# Patient Record
Sex: MALE | Race: WHITE | NOT HISPANIC OR LATINO | Employment: STUDENT | URBAN - METROPOLITAN AREA
[De-identification: names, ages, dates, MRNs, and addresses within clinical notes are randomized per-mention and may not be internally consistent; named-entity substitution may affect disease eponyms.]

---
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
## 2023-02-06 ENCOUNTER — OFFICE VISIT (OUTPATIENT)
Dept: INTERNAL MEDICINE | Facility: CLINIC | Age: 18
End: 2023-02-06
Payer: COMMERCIAL

## 2023-02-06 DIAGNOSIS — S16.1XXA STRAIN OF NECK MUSCLE, INITIAL ENCOUNTER: Primary | ICD-10-CM

## 2023-02-06 PROCEDURE — 1160F RVW MEDS BY RX/DR IN RCRD: CPT | Mod: CPTII,95,, | Performed by: PHYSICIAN ASSISTANT

## 2023-02-06 PROCEDURE — 1159F MED LIST DOCD IN RCRD: CPT | Mod: CPTII,95,, | Performed by: PHYSICIAN ASSISTANT

## 2023-02-06 PROCEDURE — 1159F PR MEDICATION LIST DOCUMENTED IN MEDICAL RECORD: ICD-10-PCS | Mod: CPTII,95,, | Performed by: PHYSICIAN ASSISTANT

## 2023-02-06 PROCEDURE — 99214 OFFICE O/P EST MOD 30 MIN: CPT | Mod: 95,,, | Performed by: PHYSICIAN ASSISTANT

## 2023-02-06 PROCEDURE — 1160F PR REVIEW ALL MEDS BY PRESCRIBER/CLIN PHARMACIST DOCUMENTED: ICD-10-PCS | Mod: CPTII,95,, | Performed by: PHYSICIAN ASSISTANT

## 2023-02-06 PROCEDURE — 99214 PR OFFICE/OUTPT VISIT, EST, LEVL IV, 30-39 MIN: ICD-10-PCS | Mod: 95,,, | Performed by: PHYSICIAN ASSISTANT

## 2023-02-06 RX ORDER — IBUPROFEN 800 MG/1
800 TABLET ORAL EVERY 8 HOURS PRN
Qty: 20 TABLET | Refills: 0 | Status: SHIPPED | OUTPATIENT
Start: 2023-02-06 | End: 2023-02-16

## 2023-02-06 RX ORDER — TIZANIDINE 4 MG/1
4 TABLET ORAL NIGHTLY PRN
Qty: 10 TABLET | Refills: 0 | Status: SHIPPED | OUTPATIENT
Start: 2023-02-06 | End: 2023-02-16

## 2023-02-06 NOTE — PROGRESS NOTES
Subjective:      Patient ID: Agusto Meza is a 17 y.o. male.    Chief Complaint: No chief complaint on file.    The patient location is: [place]   The chief complaint leading to consultation is: neck pain    Visit type: audiovisual    Face to Face time with patient: 9:58-10:02  10 minutes of total time spent on the encounter, which includes face to face time and non-face to face time preparing to see the patient (eg, review of tests), Obtaining and/or reviewing separately obtained history, Documenting clinical information in the electronic or other health record, Independently interpreting results (not separately reported) and communicating results to the patient/family/caregiver, or Care coordination (not separately reported).     Each patient to whom he or she provides medical services by telemedicine is:  (1) informed of the relationship between the physician and patient and the respective role of any other health care provider with respect to management of the patient; and (2) notified that he or she may decline to receive medical services by telemedicine and may withdraw from such care at any time.    Notes: Patient is new to me, being seen today for neck pain.  Reports woke up Saturday with neck pain, seemed better yesterday but this morning worse again.  Feels he may have slept wrong.  Pain radiates from neck musculature and radiates towards shoulder blades.  Denies arm pain, numbness/tingling.     Has tried ibuprofen, heating pad without relief    Review of Systems   Constitutional:  Negative for activity change, chills, diaphoresis, fever and unexpected weight change.   HENT:  Negative for hearing loss, rhinorrhea and trouble swallowing.    Eyes:  Negative for discharge and visual disturbance.   Respiratory:  Negative for chest tightness and wheezing.    Cardiovascular:  Negative for chest pain and palpitations.   Gastrointestinal:  Negative for blood in stool, constipation, diarrhea and vomiting.    Endocrine: Negative for polydipsia and polyuria.   Genitourinary:  Negative for difficulty urinating, hematuria and urgency.   Musculoskeletal:  Positive for neck pain. Negative for arthralgias, joint swelling and myalgias.   Neurological:  Negative for dizziness, weakness, light-headedness and headaches.   Psychiatric/Behavioral:  Negative for confusion and dysphoric mood.      Objective:   There were no vitals taken for this visit.  Physical Exam  Constitutional:       General: He is not in acute distress.     Appearance: He is well-developed. He is not ill-appearing or diaphoretic.   HENT:      Head: Normocephalic and atraumatic.      Right Ear: External ear normal.      Left Ear: External ear normal.   Eyes:      General: Lids are normal.         Right eye: No discharge.         Left eye: No discharge.      Conjunctiva/sclera: Conjunctivae normal.      Right eye: Right conjunctiva is not injected.      Left eye: Left conjunctiva is not injected.   Neck:     Pulmonary:      Effort: Pulmonary effort is normal. No respiratory distress.   Musculoskeletal:      Cervical back: Normal range of motion. Muscular tenderness present.   Skin:     General: Skin is warm and dry.      Findings: No rash.   Neurological:      Mental Status: He is alert and oriented to person, place, and time.   Psychiatric:         Speech: Speech normal.         Behavior: Behavior normal.         Thought Content: Thought content normal.         Judgment: Judgment normal.     Assessment:      1. Strain of neck muscle, initial encounter       Plan:   Strain of neck muscle, initial encounter  -     ibuprofen (ADVIL,MOTRIN) 800 MG tablet; Take 1 tablet (800 mg total) by mouth every 8 (eight) hours as needed for Pain.  Dispense: 20 tablet; Refill: 0  -     tiZANidine (ZANAFLEX) 4 MG tablet; Take 1 tablet (4 mg total) by mouth nightly as needed (Muscle strain).  Dispense: 10 tablet; Refill: 0      Discussed RICE, NSAIDs, warm heat  Caution with  muscle relaxer as may cause drowsienss     Discussed worsening signs/symptoms and when to return to clinic or go to ED.   Patient expresses understanding and agrees with treatment plan.

## 2023-11-10 ENCOUNTER — OFFICE VISIT (OUTPATIENT)
Dept: FAMILY MEDICINE | Facility: CLINIC | Age: 18
End: 2023-11-10
Payer: COMMERCIAL

## 2023-11-10 VITALS
HEIGHT: 71 IN | SYSTOLIC BLOOD PRESSURE: 110 MMHG | WEIGHT: 198.94 LBS | BODY MASS INDEX: 27.85 KG/M2 | DIASTOLIC BLOOD PRESSURE: 70 MMHG | OXYGEN SATURATION: 97 % | TEMPERATURE: 98 F | HEART RATE: 79 BPM

## 2023-11-10 DIAGNOSIS — J32.9 SINUSITIS, UNSPECIFIED CHRONICITY, UNSPECIFIED LOCATION: Primary | ICD-10-CM

## 2023-11-10 DIAGNOSIS — R05.9 COUGH, UNSPECIFIED TYPE: ICD-10-CM

## 2023-11-10 PROCEDURE — 3078F DIAST BP <80 MM HG: CPT | Mod: CPTII,S$GLB,, | Performed by: REGISTERED NURSE

## 2023-11-10 PROCEDURE — 3074F SYST BP LT 130 MM HG: CPT | Mod: CPTII,S$GLB,, | Performed by: REGISTERED NURSE

## 2023-11-10 PROCEDURE — 99999 PR PBB SHADOW E&M-EST. PATIENT-LVL III: CPT | Mod: PBBFAC,,, | Performed by: REGISTERED NURSE

## 2023-11-10 PROCEDURE — 3008F PR BODY MASS INDEX (BMI) DOCUMENTED: ICD-10-PCS | Mod: CPTII,S$GLB,, | Performed by: REGISTERED NURSE

## 2023-11-10 PROCEDURE — 3008F BODY MASS INDEX DOCD: CPT | Mod: CPTII,S$GLB,, | Performed by: REGISTERED NURSE

## 2023-11-10 PROCEDURE — 99213 OFFICE O/P EST LOW 20 MIN: CPT | Mod: 25,S$GLB,, | Performed by: REGISTERED NURSE

## 2023-11-10 PROCEDURE — 96372 THER/PROPH/DIAG INJ SC/IM: CPT | Mod: S$GLB,,, | Performed by: REGISTERED NURSE

## 2023-11-10 PROCEDURE — 1159F MED LIST DOCD IN RCRD: CPT | Mod: CPTII,S$GLB,, | Performed by: REGISTERED NURSE

## 2023-11-10 PROCEDURE — 3078F PR MOST RECENT DIASTOLIC BLOOD PRESSURE < 80 MM HG: ICD-10-PCS | Mod: CPTII,S$GLB,, | Performed by: REGISTERED NURSE

## 2023-11-10 PROCEDURE — 3074F PR MOST RECENT SYSTOLIC BLOOD PRESSURE < 130 MM HG: ICD-10-PCS | Mod: CPTII,S$GLB,, | Performed by: REGISTERED NURSE

## 2023-11-10 PROCEDURE — 96372 PR INJECTION,THERAP/PROPH/DIAG2ST, IM OR SUBCUT: ICD-10-PCS | Mod: S$GLB,,, | Performed by: REGISTERED NURSE

## 2023-11-10 PROCEDURE — 99999 PR PBB SHADOW E&M-EST. PATIENT-LVL III: ICD-10-PCS | Mod: PBBFAC,,, | Performed by: REGISTERED NURSE

## 2023-11-10 PROCEDURE — 1159F PR MEDICATION LIST DOCUMENTED IN MEDICAL RECORD: ICD-10-PCS | Mod: CPTII,S$GLB,, | Performed by: REGISTERED NURSE

## 2023-11-10 PROCEDURE — 99213 PR OFFICE/OUTPT VISIT, EST, LEVL III, 20-29 MIN: ICD-10-PCS | Mod: 25,S$GLB,, | Performed by: REGISTERED NURSE

## 2023-11-10 RX ORDER — METHYLPREDNISOLONE ACETATE 80 MG/ML
80 INJECTION, SUSPENSION INTRA-ARTICULAR; INTRALESIONAL; INTRAMUSCULAR; SOFT TISSUE ONCE
Status: COMPLETED | OUTPATIENT
Start: 2023-11-10 | End: 2023-11-10

## 2023-11-10 RX ORDER — AMOXICILLIN AND CLAVULANATE POTASSIUM 875; 125 MG/1; MG/1
1 TABLET, FILM COATED ORAL 2 TIMES DAILY
Qty: 14 TABLET | Refills: 0 | Status: SHIPPED | OUTPATIENT
Start: 2023-11-10 | End: 2023-11-17

## 2023-11-10 RX ADMIN — METHYLPREDNISOLONE ACETATE 80 MG: 80 INJECTION, SUSPENSION INTRA-ARTICULAR; INTRALESIONAL; INTRAMUSCULAR; SOFT TISSUE at 10:11

## 2023-11-10 NOTE — PROGRESS NOTES
"SUBJECTIVE:     Agusto Meza is a 18 y.o. male is here today for:  Sinus Problem and Headache    HPI:    Max is here with c/o illness x 10 days.  Woke up today feeling worse, severe HA pain.  Tx with Mucinex but has not helped.  Negative for any ill contacts although is a college student; roommate has been well.  Reports RN, NC, sinus pressure, sore throat w/ prod cough.      REVIEW OF SYSTEMS:  Review of Systems   Constitutional:  Negative for chills and fever.   HENT:  Positive for congestion, sinus pain and sore throat. Negative for ear pain and tinnitus.    Respiratory:  Positive for cough and sputum production. Negative for hemoptysis, shortness of breath and wheezing.    Cardiovascular: Negative.    Neurological:  Positive for headaches. Negative for dizziness, tingling and sensory change.   All other systems reviewed and are negative.        CURRENT ALLERGIES:  Review of patient's allergies indicates:  No Known Allergies      CURRENT PROBLEM LIST:  Patient Active Problem List   Diagnosis    Acne vulgaris       CURRENT MEDICATION LIST:  No current outpatient medications on file.      HISTORY:  Past medical, surgical, family and social histories have been reviewed today.      OBJECTIVE:     Vitals:    11/10/23 1006   BP: 110/70   Pulse: 79   Temp: 97.8 °F (36.6 °C)   SpO2: 97%   Weight: 90.2 kg (198 lb 15.4 oz)   Height: 5' 11" (1.803 m)   PainSc:   5   PainLoc: Head       Physical Exam  Vitals reviewed.   Constitutional:       General: He is not in acute distress.  HENT:      Head: Normocephalic and atraumatic.      Right Ear: Tympanic membrane normal.      Left Ear: Tympanic membrane normal.      Nose: Mucosal edema and congestion present. No rhinorrhea.      Right Sinus: Frontal sinus tenderness present. No maxillary sinus tenderness.      Left Sinus: Frontal sinus tenderness present. No maxillary sinus tenderness.      Mouth/Throat:      Mouth: Mucous membranes are moist.      Pharynx: Oropharynx is " clear. No oropharyngeal exudate or posterior oropharyngeal erythema.   Eyes:      Pupils: Pupils are equal, round, and reactive to light.   Cardiovascular:      Rate and Rhythm: Normal rate and regular rhythm.      Pulses: Normal pulses.      Heart sounds: Normal heart sounds.   Pulmonary:      Effort: Pulmonary effort is normal.      Breath sounds: Normal breath sounds.   Musculoskeletal:         General: Normal range of motion.      Cervical back: Normal range of motion and neck supple. No rigidity.      Right lower leg: No edema.      Left lower leg: No edema.   Lymphadenopathy:      Cervical: No cervical adenopathy.   Skin:     Capillary Refill: Capillary refill takes less than 2 seconds.   Neurological:      Mental Status: He is alert and oriented to person, place, and time.      Motor: No weakness.      Gait: Gait normal.   Psychiatric:         Mood and Affect: Mood normal.         Behavior: Behavior normal.         Thought Content: Thought content normal.         Judgment: Judgment normal.         ASSESSMENT:     1. Sinusitis, unspecified chronicity, unspecified location  -     methylPREDNISolone acetate injection 80 mg  -     amoxicillin-clavulanate 875-125mg (AUGMENTIN) 875-125 mg per tablet; Take 1 tablet by mouth 2 (two) times daily. for 7 days  Dispense: 14 tablet; Refill: 0    2. Cough, unspecified type  -     methylPREDNISolone acetate injection 80 mg      PLAN:     Symptomatic care, rest, hydration.  Contact office back if illness worsens or persists.  RTC as directed and/or prn.        LJ Gorman  [facility]       25 minutes of total time spent on the encounter, which includes face to face time and non-face to face time preparing to see the patient.  This includes obtaining and/or reviewing separately obtained history, performing a medically appropriate examination and/or evaluation, and counseling and educating the patient/family/caregiver.  Includes  documenting clinical information in the electronic or other health record, independently interpreting results (not separately reported) and communicating results to the patient/family/caregiver, with care coordination (not separately reported).  Medications, tests and/or procedures ordered as necessary along with referring and communicating with other health professionals (when not separately reported).

## 2024-12-13 ENCOUNTER — OFFICE VISIT (OUTPATIENT)
Dept: DERMATOLOGY | Facility: CLINIC | Age: 19
End: 2024-12-13
Payer: COMMERCIAL

## 2024-12-13 DIAGNOSIS — L70.0 ACNE VULGARIS: Primary | ICD-10-CM

## 2024-12-13 PROCEDURE — 99999 PR PBB SHADOW E&M-EST. PATIENT-LVL III: CPT | Mod: PBBFAC,,, | Performed by: PHYSICIAN ASSISTANT

## 2024-12-13 RX ORDER — DOXYCYCLINE HYCLATE 100 MG
TABLET ORAL
Qty: 60 TABLET | Refills: 0 | Status: SHIPPED | OUTPATIENT
Start: 2024-12-13

## 2024-12-13 RX ORDER — TAZAROTENE 0.5 MG/G
CREAM TOPICAL
Qty: 60 G | Refills: 1 | Status: SHIPPED | OUTPATIENT
Start: 2024-12-13

## 2024-12-13 RX ORDER — DAPSONE 75 MG/G
GEL TOPICAL
Qty: 60 G | Refills: 2 | Status: SHIPPED | OUTPATIENT
Start: 2024-12-13

## 2024-12-13 NOTE — PATIENT INSTRUCTIONS
Acne Treatment    Retinoids (e.g. adapalene, tretinoin, tazarotene) are vitamin A derivatives that are the mainstay of acne therapy. The skin often becomes dry, red, or irritated when first using them--this is a normal period of adjustment.   Use only a pea-sized amount for the entire face to avoid excess irritation.   If your skin is sensitive, begin by using the medication two nights per week or every other night for the first couple of months until your skin adjusts.   Use as much oil-free moisturizer as needed to help your skin adjust to the retinoid.  There is no miracle, overnight cure for acne. It may take 6-8 weeks to start seeing some improvement, and you should continue to improve over the following months. It is important that you keep your follow up appointments so that any medication changes can be made if necessary.  Your acne may get worse before it gets better. This is normal! Just hang in there, incorporate the meds into your daily routine, and trust that the medication will work.   Do not scrub your face. Aggressive scrubbing can make acne worse. Gently washing your face 2x/day is essential to any successful acne regimen.   Do not pick or squeeze your pimples, as this will delay resolution of the picked/squeezed lesions and potentially lead to scarring. Acne is temporary, but scars are permanent.  Antibiotics: Antibiotics are sometimes prescribed to decrease acne by reducing inflammation. They are not a good long-term solution; they have side effects as all meds do; and they should always be used along with the topical treatments that are recommended.  Waxing: Stop using the retinoid 1 week prior to any waxing, as skin is more likely to tear.  Diet: Avoid eating foods with a high glycemic load/index (high sugar, simple carbs) which can worsen acne. Also, avoid drinking a lot of skim or low fat milk, and avoid the whey protein found in most protein shakes and bars, as these can also worsen  "acne.  Makeup: Use only oil-free, non-comedogenic makeup. Brands to consider include Neutrogena, Tarte, Bare Minerals, Lelia Iredale, Cheryl Sparks, Clinique. (Avoid MAC.)  For female patients: Discontinue all oral and topical acne medications if you become pregnant or are planning to become pregnant. Notify our office, and we will direct you to medications that are safe to use during pregnancy.    Morning acne regimen:  ?  Wash face with sal acid 2% (sample) or gentle cleanser) cleanser. See below for suggestions.  ?  +/-Apply a thin film of Dapsone to individual breakouts, or to the entire face if needed.  ?  If skin feels dry, apply a fragrance-free moisturizer, such as CeraVe AM lotion with SPF (Apply a broad-spectrum sunscreen with SPF 30 or higher (This is especially important to avoid "dark spots" that can follow an acne lesion.))    Evening acne regimen:  ?  Wash face with BP 4% (sample) cleanser.     ?  Apply a pea-sized amount of tazorac 0.05% (retinoid) to the entire face.    Tretinoin: We reviewed that a pea sized amount of the topical retinoid is to be applied to the entire face at night and that it is not spot treatment. Patient to use every other night or 3 nights a week and gradually increase to goal of nightly use (as tolerated). Side effects reviewed to include but not limited to redness, dryness, flaking, burning/tingling sensation, skin irritation, and feeling of tightness.     ?  Apply a fragrance-free moisturizer, such as CeraVe PM lotion, if needed for dryness.    Cleanser options:  Gentle cleansers: CeraVe foaming wash, CeraVe hydrating cleanser, Neutrogena Ultra Gentle cleanser, Cetaphil cleanser  Benzoyl peroxide (2-5%): PanOxyl 4% Creamy Wash, Neutrogena Clear Pore Cleanser/Mask             *Note that benzoyl peroxide can bleach towels, sheets, and clothing if not rinsed well from the skin. May be best to keep in the shower.*  Salicylic acid (0.5-2%): CeraVe Renewing SA Cleanser, Neutrogena " Oil-Free Acne Wash, Neutrogena Acne Proofing Gel Cleanser        Do not apply Dapsone after using BP wash- this can cause a temporary orange discoloration to occurs.     Doxycycline:   Discussed benefits and risks of doxycyline therapy including but not limited to GI discomfort, esophageal irritation/ulceration, and increased sun sensitivity. Patient was counseled to take medicine with meals and at least 1 hour before lying down.

## 2024-12-13 NOTE — PROGRESS NOTES
Subjective:      Patient ID:  Agusto Meza is a 19 y.o. male who presents for   Chief Complaint   Patient presents with    Acne     face     Pt is present with acne. He reports completing 6 months worth of Amsteen (generic of Accutane) at the end of 2019. He face was clear after that but flared again with Covid and having to wear a mask. He reports having oily skin. His skin flares with stress. He reports that he currently gets acne most frequently on his cheeks.     Patient with new area of concern:   Location: face  Duration: 4 yrs  S/S: none  Previous treatments: Currently using T tree oil & panoxyl (10%)      Review of Systems   HENT:  Negative for nosebleeds and headaches.    Gastrointestinal:  Negative for diarrhea and Sensitivity to oral antibiotics.   Musculoskeletal:  Negative for arthralgias.   Skin:  Positive for daily sunscreen use and activity-related sunscreen use. Negative for itching, rash and recent sunburn.   Neurological:  Negative for headaches.   Psychiatric/Behavioral:  Negative for depressed mood.        Objective:   Physical Exam   Constitutional: He appears well-developed and well-nourished. No distress.   Neurological: He is alert and oriented to person, place, and time. He is not disoriented.   Psychiatric: He has a normal mood and affect.   Skin:   Areas Examined (abnormalities noted in diagram):   Head / Face Inspection Performed                      Diagram Legend     Erythematous scaling macule/papule c/w actinic keratosis       Vascular papule c/w angioma      Pigmented verrucoid papule/plaque c/w seborrheic keratosis      Yellow umbilicated papule c/w sebaceous hyperplasia      Irregularly shaped tan macule c/w lentigo     1-2 mm smooth white papules consistent with Milia      Movable subcutaneous cyst with punctum c/w epidermal inclusion cyst      Subcutaneous movable cyst c/w pilar cyst      Firm pink to brown papule c/w dermatofibroma      Pedunculated fleshy papule(s)  "c/w skin tag(s)      Evenly pigmented macule c/w junctional nevus     Mildly variegated pigmented, slightly irregular-bordered macule c/w mildly atypical nevus      Flesh colored to evenly pigmented papule c/w intradermal nevus       Pink pearly papule/plaque c/w basal cell carcinoma      Erythematous hyperkeratotic cursted plaque c/w SCC      Surgical scar with no sign of skin cancer recurrence      Open and closed comedones      Inflammatory papules and pustules      Verrucoid papule consistent consistent with wart     Erythematous eczematous patches and plaques     Dystrophic onycholytic nail with subungual debris c/w onychomycosis     Umbilicated papule    Erythematous-base heme-crusted tan verrucoid plaque consistent with inflamed seborrheic keratosis     Erythematous Silvery Scaling Plaque c/w Psoriasis     See annotation      Assessment / Plan:        Acne vulgaris  -     tazarotene (TAZORAC) 0.05 % Crea cream; Apply pea-sized amount to face starting every two nights and then increasing to every other night and then nightly as tolerated. Moisturize after.  Dispense: 60 g; Refill: 1  -     dapsone (ACZONE) 7.5 % GlwP; Apply a thin layer to affected areas of face daily in the morning.  Dispense: 60 g; Refill: 2  -     doxycycline (VIBRA-TABS) 100 MG tablet; Take 1 po qday with food and not within 1 hour prior to lying down  Dispense: 60 tablet; Refill: 0    Morning acne regimen:  ?  Wash face with sal acid 2% (sample) or gentle cleanser) cleanser.   ?  +/-Apply a thin film of Dapsone to individual breakouts, or to the entire face if needed.  ?  If skin feels dry, apply a fragrance-free moisturizer, such as CeraVe AM lotion with SPF (Apply a broad-spectrum sunscreen with SPF 30 or higher (This is especially important to avoid "dark spots" that can follow an acne lesion.))    Evening acne regimen:  ?  Wash face with BP 4% (sample) cleanser.     ?  Apply a pea-sized amount of tazorac 0.05% (retinoid) to the entire " face.    We reviewed that a pea sized amount of the topical is to be applied to the entire face at night and that it is not spot treatment. Patient to use every other night or 3 nights a week and gradually increase to goal of nightly use (as tolerated). Side effects reviewed to include but not limited to redness, dryness, flaking, burning/tingling sensation, skin irritation, and feeling of tightness.     ?  Apply a fragrance-free moisturizer, such as CeraVe PM lotion, if needed for dryness.     Do not apply Dapsone after using BP wash- this can cause a temporary orange discoloration to occurs.     Doxycycline:   Discussed benefits and risks of doxycyline therapy including but not limited to GI discomfort, esophageal irritation/ulceration, and increased sun sensitivity. Patient was counseled to take medicine with meals and at least 1 hour before lying down.     Follow up in about 3 months (around 3/13/2025).

## 2024-12-16 ENCOUNTER — PATIENT MESSAGE (OUTPATIENT)
Dept: DERMATOLOGY | Facility: CLINIC | Age: 19
End: 2024-12-16
Payer: COMMERCIAL

## 2024-12-16 DIAGNOSIS — L70.0 ACNE VULGARIS: Primary | ICD-10-CM

## 2024-12-16 RX ORDER — CLASCOTERONE 1 G/100G
CREAM TOPICAL
Qty: 60 G | Refills: 3 | Status: SHIPPED | OUTPATIENT
Start: 2024-12-16

## 2025-01-03 DIAGNOSIS — L70.0 ACNE VULGARIS: ICD-10-CM

## 2025-01-03 RX ORDER — TAZAROTENE 0.5 MG/G
CREAM TOPICAL
Qty: 60 G | Refills: 1 | Status: SHIPPED | OUTPATIENT
Start: 2025-01-03

## 2025-01-08 ENCOUNTER — LAB VISIT (OUTPATIENT)
Dept: LAB | Facility: HOSPITAL | Age: 20
End: 2025-01-08
Attending: FAMILY MEDICINE
Payer: COMMERCIAL

## 2025-01-08 ENCOUNTER — OFFICE VISIT (OUTPATIENT)
Dept: INTERNAL MEDICINE | Facility: CLINIC | Age: 20
End: 2025-01-08
Payer: COMMERCIAL

## 2025-01-08 VITALS
SYSTOLIC BLOOD PRESSURE: 118 MMHG | OXYGEN SATURATION: 98 % | DIASTOLIC BLOOD PRESSURE: 68 MMHG | WEIGHT: 195.31 LBS | HEART RATE: 68 BPM | RESPIRATION RATE: 18 BRPM | BODY MASS INDEX: 27.34 KG/M2 | HEIGHT: 71 IN

## 2025-01-08 DIAGNOSIS — Z00.00 ROUTINE GENERAL MEDICAL EXAMINATION AT A HEALTH CARE FACILITY: ICD-10-CM

## 2025-01-08 DIAGNOSIS — L70.0 ACNE VULGARIS: ICD-10-CM

## 2025-01-08 DIAGNOSIS — Z00.00 ROUTINE GENERAL MEDICAL EXAMINATION AT A HEALTH CARE FACILITY: Primary | ICD-10-CM

## 2025-01-08 LAB
ALBUMIN SERPL BCP-MCNC: 4.6 G/DL (ref 3.5–5.2)
ALP SERPL-CCNC: 90 U/L (ref 40–150)
ALT SERPL W/O P-5'-P-CCNC: 43 U/L (ref 10–44)
ANION GAP SERPL CALC-SCNC: 10 MMOL/L (ref 8–16)
AST SERPL-CCNC: 25 U/L (ref 10–40)
BASOPHILS # BLD AUTO: 0.12 K/UL (ref 0–0.2)
BASOPHILS NFR BLD: 1.3 % (ref 0–1.9)
BILIRUB SERPL-MCNC: 0.6 MG/DL (ref 0.1–1)
BUN SERPL-MCNC: 13 MG/DL (ref 6–20)
CALCIUM SERPL-MCNC: 9.8 MG/DL (ref 8.7–10.5)
CHLORIDE SERPL-SCNC: 105 MMOL/L (ref 95–110)
CHOLEST SERPL-MCNC: 154 MG/DL (ref 120–199)
CHOLEST/HDLC SERPL: 3 {RATIO} (ref 2–5)
CO2 SERPL-SCNC: 25 MMOL/L (ref 23–29)
CREAT SERPL-MCNC: 1.1 MG/DL (ref 0.5–1.4)
DIFFERENTIAL METHOD BLD: ABNORMAL
EOSINOPHIL # BLD AUTO: 0.1 K/UL (ref 0–0.5)
EOSINOPHIL NFR BLD: 0.9 % (ref 0–8)
ERYTHROCYTE [DISTWIDTH] IN BLOOD BY AUTOMATED COUNT: 12.7 % (ref 11.5–14.5)
EST. GFR  (NO RACE VARIABLE): >60 ML/MIN/1.73 M^2
ESTIMATED AVG GLUCOSE: 100 MG/DL (ref 68–131)
GLUCOSE SERPL-MCNC: 86 MG/DL (ref 70–110)
HBA1C MFR BLD: 5.1 % (ref 4–5.6)
HCT VFR BLD AUTO: 48.7 % (ref 40–54)
HCV AB SERPL QL IA: NORMAL
HDLC SERPL-MCNC: 51 MG/DL (ref 40–75)
HDLC SERPL: 33.1 % (ref 20–50)
HGB BLD-MCNC: 15.9 G/DL (ref 14–18)
HIV 1+2 AB+HIV1 P24 AG SERPL QL IA: NORMAL
IMM GRANULOCYTES # BLD AUTO: 0.06 K/UL (ref 0–0.04)
IMM GRANULOCYTES NFR BLD AUTO: 0.6 % (ref 0–0.5)
LDLC SERPL CALC-MCNC: 58.2 MG/DL (ref 63–159)
LYMPHOCYTES # BLD AUTO: 2.7 K/UL (ref 1–4.8)
LYMPHOCYTES NFR BLD: 28.3 % (ref 18–48)
MCH RBC QN AUTO: 29.7 PG (ref 27–31)
MCHC RBC AUTO-ENTMCNC: 32.6 G/DL (ref 32–36)
MCV RBC AUTO: 91 FL (ref 82–98)
MONOCYTES # BLD AUTO: 0.7 K/UL (ref 0.3–1)
MONOCYTES NFR BLD: 7.5 % (ref 4–15)
NEUTROPHILS # BLD AUTO: 5.8 K/UL (ref 1.8–7.7)
NEUTROPHILS NFR BLD: 61.4 % (ref 38–73)
NONHDLC SERPL-MCNC: 103 MG/DL
NRBC BLD-RTO: 0 /100 WBC
PLATELET # BLD AUTO: 328 K/UL (ref 150–450)
PMV BLD AUTO: 10.8 FL (ref 9.2–12.9)
POTASSIUM SERPL-SCNC: 3.6 MMOL/L (ref 3.5–5.1)
PROT SERPL-MCNC: 7.8 G/DL (ref 6–8.4)
RBC # BLD AUTO: 5.36 M/UL (ref 4.6–6.2)
SODIUM SERPL-SCNC: 140 MMOL/L (ref 136–145)
TRIGL SERPL-MCNC: 224 MG/DL (ref 30–150)
TSH SERPL DL<=0.005 MIU/L-ACNC: 1.19 UIU/ML (ref 0.4–4)
WBC # BLD AUTO: 9.49 K/UL (ref 3.9–12.7)

## 2025-01-08 PROCEDURE — 85025 COMPLETE CBC W/AUTO DIFF WBC: CPT | Performed by: FAMILY MEDICINE

## 2025-01-08 PROCEDURE — 83036 HEMOGLOBIN GLYCOSYLATED A1C: CPT | Performed by: FAMILY MEDICINE

## 2025-01-08 PROCEDURE — 99999 PR PBB SHADOW E&M-EST. PATIENT-LVL III: CPT | Mod: PBBFAC,,, | Performed by: FAMILY MEDICINE

## 2025-01-08 PROCEDURE — 84443 ASSAY THYROID STIM HORMONE: CPT | Performed by: FAMILY MEDICINE

## 2025-01-08 PROCEDURE — 86803 HEPATITIS C AB TEST: CPT | Performed by: FAMILY MEDICINE

## 2025-01-08 PROCEDURE — 36415 COLL VENOUS BLD VENIPUNCTURE: CPT | Performed by: FAMILY MEDICINE

## 2025-01-08 PROCEDURE — 80061 LIPID PANEL: CPT | Performed by: FAMILY MEDICINE

## 2025-01-08 PROCEDURE — 80053 COMPREHEN METABOLIC PANEL: CPT | Performed by: FAMILY MEDICINE

## 2025-01-08 PROCEDURE — 87389 HIV-1 AG W/HIV-1&-2 AB AG IA: CPT | Performed by: FAMILY MEDICINE

## 2025-01-08 NOTE — PROGRESS NOTES
"Subjective:       Patient ID: Agusto Meza is a 19 y.o. male presents with   Patient Active Problem List   Diagnosis    Acne vulgaris        Chief Complaint: Annual Exam    History of Present Illness    Agusto presents today for a yearly physical exam establish care. He is experiencing issues with current acne medication. He reports stomachaches after taking doxycycline for three weeks, possibly due to delayed intake after meals. He is considering a second round of Accutane as suggested by dermatologist. He exercises regularly, attending gym 5 times weekly.  Denies of any other significant complaints today      ROS:  General: -fever, -chills, -fatigue, -weight gain, -weight loss, -loss of appetite  Eyes: -vision changes, -blurry vision, -eye pain, -eye discharge  ENT: -ear pain, -hearing loss, -tinnitus, -nasal congestion, -sore throat  Cardiovascular: -chest pain, -palpitations, -lower extremity edema  Respiratory: -cough, -shortness of breath, -wheezing, -sputum production  Endocrine: -polyuria, -polydipsia, -heat intolerance, -cold intolerance  Gastrointestinal: +abdominal pain, -heartburn, -nausea, -vomiting, -diarrhea, -constipation, -blood in stool  Genitourinary: -dysuria, -urgency, -frequency, -hematuria, -nocturia, -incontinence  Heme & Lymphatic: -easy or excessive bleeding, -easy bruising, -swollen lymph nodes  Musculoskeletal: -muscle pain, -back pain, -joint pain, -joint swelling  Skin: -rash, -lesion, -itching, -skin texture changes, -skin color changes  Neurological: -headache, -dizziness, -numbness, -tingling, -seizure activity, -speech difficulty, -memory loss, -confusion  Psychiatric: -anxiety, -depression, -sleep difficulty                /68 (BP Location: Right arm, Patient Position: Sitting)   Pulse 68   Resp 18   Ht 5' 11" (1.803 m)   Wt 88.6 kg (195 lb 5.2 oz)   SpO2 98%   BMI 27.24 kg/m²   Objective:      Physical Exam  Constitutional:       Appearance: He is " well-developed.   HENT:      Head: Normocephalic and atraumatic.   Cardiovascular:      Rate and Rhythm: Normal rate and regular rhythm.      Heart sounds: Normal heart sounds. No murmur heard.  Pulmonary:      Effort: Pulmonary effort is normal.      Breath sounds: Normal breath sounds. No wheezing.   Abdominal:      General: Bowel sounds are normal.      Palpations: Abdomen is soft.      Tenderness: There is no abdominal tenderness.   Skin:     General: Skin is warm and dry.      Findings: No rash.   Neurological:      Mental Status: He is alert and oriented to person, place, and time.   Psychiatric:         Mood and Affect: Mood normal.           Assessment/Plan:   1. Routine general medical examination at a health care facility  -     Urinalysis, Reflex to Urine Culture Urine, Clean Catch; Future; Expected date: 01/08/2025  -     Hemoglobin A1C; Future; Expected date: 01/08/2025  -     TSH; Future; Expected date: 01/08/2025  -     Lipid Panel; Future; Expected date: 01/08/2025  -     Comprehensive Metabolic Panel; Future; Expected date: 01/08/2025  -     CBC Auto Differential; Future; Expected date: 01/08/2025  -     Hepatitis C Antibody; Future; Expected date: 01/08/2025  -     HIV 1/2 Ag/Ab (4th Gen); Future; Expected date: 01/08/2025  Vital signs stable today.  Clinical exam stable  Continue lifestyle modifications with low-fat and low-cholesterol diet and exercise 30 minutes daily    2. Acne vulgaris  Overview:  Wt: 73.5kg    Goal dosage: 11,025mg - 16,170mg (150-220mg/kg)  Followed by Dermatology           This note was generated with the assistance of ambient listening technology. Verbal consent was obtained by the patient and accompanying visitor(s) for the recording of patient appointment to facilitate this note. I attest to having reviewed and edited the generated note for accuracy, though some syntax or spelling errors may persist. Please contact the author of this note for any clarification.

## 2025-03-12 ENCOUNTER — TELEPHONE (OUTPATIENT)
Dept: DERMATOLOGY | Facility: CLINIC | Age: 20
End: 2025-03-12
Payer: COMMERCIAL

## 2025-03-13 ENCOUNTER — OFFICE VISIT (OUTPATIENT)
Dept: DERMATOLOGY | Facility: CLINIC | Age: 20
End: 2025-03-13
Payer: COMMERCIAL

## 2025-03-13 DIAGNOSIS — L70.0 ACNE VULGARIS: Primary | ICD-10-CM

## 2025-03-13 NOTE — PATIENT INSTRUCTIONS
Acne vulgaris    Regimen  AM:  Washes with sal acid and applies dapsone. Washes with gentle wash after working out.     PM:  Washes with Panoxyl 4% and then applies tretinoin 0.05%; currently using every 3 days    Continue slowly increasing frequency of tretinoin use as tolerated.     Follow up in 3 months.

## 2025-03-13 NOTE — PROGRESS NOTES
Patient Information  Name: Agusto Meza  : 2005  MRN: [id number]     Referring Physician:  Dr. Cisneros ref. provider found   Primary Care Physician:  Naheed Palacios MD   Date of Visit: 2025      Subjective:       Agusto Meza is a 19 y.o. male who presents for acne.      HPI  He was only able to take doxy 100 mg for one month due to GI upset. Has been using Dapsone for the last 3 months, but just recently started using tretinoin 0.05%.    Regimen  AM:  Washes with sal acid and applies dapsone. Washes with gentle wash after working out.     PM:  Washes with Panoxyl 4% and then applies tretinoin 0.05%; currently using every 3 days      Patient was last seen in Dermatology: 2024.    Prior notes by myself reviewed.       Review of Systems   Skin:  Negative for itching and rash.        Objective:    Physical Exam   Constitutional: He appears well-developed and well-nourished. No distress.   Neurological: He is alert and oriented to person, place, and time. He is not disoriented.   Psychiatric: He has a normal mood and affect.   Skin:   Areas Examined (abnormalities noted in diagram):   Head / Face Inspection Performed              Diagram Legend     Erythematous scaling macule/papule c/w actinic keratosis       Vascular papule c/w angioma      Pigmented verrucoid papule/plaque c/w seborrheic keratosis      Yellow umbilicated papule c/w sebaceous hyperplasia      Irregularly shaped tan macule c/w lentigo     1-2 mm smooth white papules consistent with Milia      Movable subcutaneous cyst with punctum c/w epidermal inclusion cyst      Subcutaneous movable cyst c/w pilar cyst      Firm pink to brown papule c/w dermatofibroma      Pedunculated fleshy papule(s) c/w skin tag(s)      Evenly pigmented macule c/w junctional nevus     Mildly variegated pigmented, slightly irregular-bordered macule c/w mildly atypical nevus      Flesh colored to evenly pigmented papule c/w intradermal nevus        Pink pearly papule/plaque c/w basal cell carcinoma      Erythematous hyperkeratotic cursted plaque c/w SCC      Surgical scar with no sign of skin cancer recurrence      Open and closed comedones      Inflammatory papules and pustules      Verrucoid papule consistent consistent with wart     Erythematous eczematous patches and plaques     Dystrophic onycholytic nail with subungual debris c/w onychomycosis     Umbilicated papule    Erythematous-base heme-crusted tan verrucoid plaque consistent with inflamed seborrheic keratosis     Erythematous Silvery Scaling Plaque c/w Psoriasis     See annotation              [] Data reviewed    [] Prior external notes reviewed    [] Independent review of test    [] Management discussed with another provider    [] Independent historian    Assessment / Plan:        Acne vulgaris    Regimen  AM:  Washes with sal acid and applies dapsone. Washes with gentle wash after working out.     PM:  Washes with Panoxyl 4% and then applies tretinoin 0.05%; currently using every 3 days    Continue slowly increasing frequency of tretinoin use as tolerated.     Follow up in 3 months.          The patient location is: [place]  The chief complaint leading to consultation is: acne    Visit type: audiovisual    Face to Face time with patient: 11 minutes  11 minutes of total time spent on the encounter, which includes face to face time and non-face to face time preparing to see the patient (eg, review of tests), Obtaining and/or reviewing separately obtained history, Documenting clinical information in the electronic or other health record, Independently interpreting results (not separately reported) and communicating results to the patient/family/caregiver, or Care coordination (not separately reported).         Each patient to whom he or she provides medical services by telemedicine is:  (1) informed of the relationship between the physician and patient and the respective role of any other health care  provider with respect to management of the patient; and (2) notified that he or she may decline to receive medical services by telemedicine and may withdraw from such care at any time.          LOS NUMBER AND COMPLEXITY OF PROBLEMS    COMPLEXITY OF DATA RISK TOTAL TIME (m)   12178  [id number] [] 1 self-limited or minor problem [] Minimal to none [] No treatment recommended or patient to monitor. Reassurance.  15-29  10-19   20455  [id number] Low  [] 2 or more self limited or minor problems  [] 1 stable chronic illness  [] 1 acute, uncomplicated illness or injury Limited (2)  [] Prior external notes from each unique source  [] Review result of each unique test  [] Order each unique test  OR [] Independent historian Low  []  OTC medications   []  Discussed/Decision for minor skin surgery (no risk factors) 30-44  20-29   91770  [id number] Moderate  []  1 or more chronic unstable illness (not at goal or progression or exacerbation) or SE of treatment  []  2 or more stable chronic illnesses  []  1 acute illness with systemic symptoms  []  1 acute complicated injury  []  1 undiagnosed new problem with uncertain prognosis Moderate (1/3 below)  []  3 or more data items        *Now includes independent historian  []  Independent interpretation of a test  []  Discuss management/test with another provider Moderate  []  Prescription drug mgmt  []  Discussed/Decision for Minor surgery with risk factors  []  Mgmt limited by social determinates 45-59  30-39   25506  69181 High  []  1 or more chronic illness with severe exacerbation, progression or SE of treatment  []  1 acute or chronic illness/injury that poses a threat to life or bodily function Extensive (2/3 below)  []  3 or more data items        *Now includes independent historian.  []  Independent interpretation of a test  []  Discuss management/test with another provider High  []  Major surgery with risk discussed  []  Drug therapy requiring intensive monitoring for toxicity  []   Hospitalization  []  Decision for DNR 60-74  40-54

## 2025-03-14 ENCOUNTER — PATIENT MESSAGE (OUTPATIENT)
Dept: DERMATOLOGY | Facility: CLINIC | Age: 20
End: 2025-03-14
Payer: COMMERCIAL

## 2025-03-14 ENCOUNTER — TELEPHONE (OUTPATIENT)
Dept: DERMATOLOGY | Facility: CLINIC | Age: 20
End: 2025-03-14
Payer: COMMERCIAL

## 2025-05-05 ENCOUNTER — PATIENT MESSAGE (OUTPATIENT)
Dept: INTERNAL MEDICINE | Facility: CLINIC | Age: 20
End: 2025-05-05
Payer: COMMERCIAL

## 2025-05-06 ENCOUNTER — OFFICE VISIT (OUTPATIENT)
Dept: INTERNAL MEDICINE | Facility: CLINIC | Age: 20
End: 2025-05-06
Payer: COMMERCIAL

## 2025-05-06 VITALS
OXYGEN SATURATION: 97 % | BODY MASS INDEX: 25.58 KG/M2 | WEIGHT: 182.75 LBS | HEIGHT: 71 IN | HEART RATE: 92 BPM | SYSTOLIC BLOOD PRESSURE: 118 MMHG | RESPIRATION RATE: 18 BRPM | DIASTOLIC BLOOD PRESSURE: 70 MMHG

## 2025-05-06 DIAGNOSIS — F41.1 GAD (GENERALIZED ANXIETY DISORDER): Primary | ICD-10-CM

## 2025-05-06 PROCEDURE — 3008F BODY MASS INDEX DOCD: CPT | Mod: CPTII,S$GLB,, | Performed by: FAMILY MEDICINE

## 2025-05-06 PROCEDURE — 3078F DIAST BP <80 MM HG: CPT | Mod: CPTII,S$GLB,, | Performed by: FAMILY MEDICINE

## 2025-05-06 PROCEDURE — 3074F SYST BP LT 130 MM HG: CPT | Mod: CPTII,S$GLB,, | Performed by: FAMILY MEDICINE

## 2025-05-06 PROCEDURE — 99999 PR PBB SHADOW E&M-EST. PATIENT-LVL IV: CPT | Mod: PBBFAC,,, | Performed by: FAMILY MEDICINE

## 2025-05-06 PROCEDURE — 1159F MED LIST DOCD IN RCRD: CPT | Mod: CPTII,S$GLB,, | Performed by: FAMILY MEDICINE

## 2025-05-06 PROCEDURE — 1160F RVW MEDS BY RX/DR IN RCRD: CPT | Mod: CPTII,S$GLB,, | Performed by: FAMILY MEDICINE

## 2025-05-06 PROCEDURE — 99213 OFFICE O/P EST LOW 20 MIN: CPT | Mod: S$GLB,,, | Performed by: FAMILY MEDICINE

## 2025-05-06 PROCEDURE — 3044F HG A1C LEVEL LT 7.0%: CPT | Mod: CPTII,S$GLB,, | Performed by: FAMILY MEDICINE

## 2025-05-06 RX ORDER — BUPROPION HYDROCHLORIDE 75 MG/1
75 TABLET ORAL DAILY
Qty: 30 TABLET | Refills: 3 | Status: SHIPPED | OUTPATIENT
Start: 2025-05-06 | End: 2025-05-06 | Stop reason: CLARIF

## 2025-05-06 RX ORDER — ESCITALOPRAM OXALATE 10 MG/1
10 TABLET ORAL DAILY
Qty: 30 TABLET | Refills: 3 | Status: SHIPPED | OUTPATIENT
Start: 2025-05-06 | End: 2026-05-06

## 2025-05-06 NOTE — PROGRESS NOTES
Subjective:       Patient ID: Agusto Meza is a 19 y.o. male presents with Problem List[1]     Chief Complaint: Anxiety    History of Present Illness    Agusto presents today for worsening anxiety and panic attacks. He reports lifelong anxiety with recent exacerbation. His panic attacks have increased in frequency over the past month, occurring almost daily in the last two weeks. Anxiety symptoms are interfering with daily activities. Multiple stressors are contributing to his increased anxiety, including school completion, parents' divorce, and an upcoming move. He denies depression and reports being happy despite persistent worry. He is currently working with a therapist. He is a non-smoker. He has a family history of addiction. January labs showed normal thyroid function with no evidence of infection or anemia.      ROS:  General: -fever, -chills, -fatigue, -weight gain, -weight loss, -loss of appetite  Eyes: -vision changes, -blurry vision, -eye pain, -eye discharge  ENT: -ear pain, -hearing loss, -tinnitus, -nasal congestion, -sore throat  Cardiovascular: -chest pain, -palpitations, -lower extremity edema  Respiratory: -cough, -shortness of breath, -wheezing, -sputum production  Endocrine: -polyuria, -polydipsia, -heat intolerance, -cold intolerance  Gastrointestinal: -abdominal pain, -heartburn, -nausea, -vomiting, -diarrhea, -constipation, -blood in stool  Genitourinary: -dysuria, -urgency, -frequency, -hematuria, -nocturia, -incontinence  Heme & Lymphatic: -easy or excessive bleeding, -easy bruising, -swollen lymph nodes  Musculoskeletal: -muscle pain, -back pain, -joint pain, -joint swelling  Skin: -rash, -lesion, -itching, -skin texture changes, -skin color changes  Neurological: -headache, -dizziness, -numbness, -tingling, -seizure activity, -speech difficulty, -memory loss, -confusion  Psychiatric: +anxiety, -depression, -sleep difficulty, +excessive worry, +panic attacks                BP  "118/70 (BP Location: Right arm, Patient Position: Sitting)   Pulse 92   Resp 18   Ht 5' 11" (1.803 m)   Wt 82.9 kg (182 lb 12.2 oz)   SpO2 97%   BMI 25.49 kg/m²   Objective:      Physical Exam  Constitutional:       Appearance: He is well-developed.   HENT:      Head: Normocephalic and atraumatic.   Cardiovascular:      Rate and Rhythm: Normal rate and regular rhythm.      Heart sounds: Normal heart sounds. No murmur heard.  Pulmonary:      Effort: Pulmonary effort is normal.      Breath sounds: Normal breath sounds. No wheezing.   Abdominal:      General: Bowel sounds are normal.      Palpations: Abdomen is soft.      Tenderness: There is no abdominal tenderness.   Skin:     General: Skin is warm and dry.      Findings: No rash.   Neurological:      Mental Status: He is alert and oriented to person, place, and time.   Psychiatric:         Mood and Affect: Mood normal.           Assessment/Plan:   1. PADMA (generalized anxiety disorder)  -     Discontinue: buPROPion (WELLBUTRIN) 75 MG tablet; Take 1 tablet (75 mg total) by mouth once daily.  Dispense: 30 tablet; Refill: 3  -     EScitalopram oxalate (LEXAPRO) 10 MG tablet; Take 1 tablet (10 mg total) by mouth once daily.  Dispense: 30 tablet; Refill: 3  Initially discussed about starting on Wellbutrin 75 mg once daily, Will has been getting therapy and therapist recommended Lexapro, patient feels more comfortable with Lexapro, will discontinue Wellbutrin 75 mg once daily and will start on Lexapro 10 mg daily    Patient was advised to give himself 2-4 weeks to help with anxiety to see if there is any improvement in symptoms    Follow-up virtually in 4-6 weeks for treatment response           This note was generated with the assistance of ambient listening technology. Verbal consent was obtained by the patient and accompanying visitor(s) for the recording of patient appointment to facilitate this note. I attest to having reviewed and edited the generated note for " accuracy, though some syntax or spelling errors may persist. Please contact the author of this note for any clarification.            [1]   Patient Active Problem List  Diagnosis    Acne vulgaris